# Patient Record
Sex: MALE | Race: WHITE | Employment: FULL TIME
[De-identification: names, ages, dates, MRNs, and addresses within clinical notes are randomized per-mention and may not be internally consistent; named-entity substitution may affect disease eponyms.]

---
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
## 2020-10-13 ENCOUNTER — OFFICE VISIT (OUTPATIENT)
Dept: PODIATRY | Age: 20
End: 2020-10-13
Payer: COMMERCIAL

## 2020-10-13 VITALS — HEIGHT: 75 IN | WEIGHT: 145 LBS | TEMPERATURE: 97.9 F | BODY MASS INDEX: 18.03 KG/M2

## 2020-10-13 PROCEDURE — 11750 EXCISION NAIL&NAIL MATRIX: CPT | Performed by: PODIATRIST

## 2020-10-13 PROCEDURE — 99203 OFFICE O/P NEW LOW 30 MIN: CPT | Performed by: PODIATRIST

## 2020-10-13 NOTE — PROGRESS NOTES
[address]  [facility] [address]  Dept: [phone]    NEW PATIENT INGROWN TOENAIL PROGRESS NOTE  Date of patient's visit: 10/13/2020  Patient's Name:  Tamika Ayala YOB: 2000              Chief Complaint   Patient presents with    New Patient    Ingrown Toenail     bl great toes        Tamika Ayala 21 y.o. male that presents complaining of a painful infected ingrown toenail on the 1st Left toes. Conservative therapy has failed. Symptoms began 1 month(s) ago. Patient relates pain is Present. Pain is rated 2 out of 10 and is described as intermittent. Treatments prior to today's visit include: none. Currently denies F/C/N/V. No Known Allergies    Past Medical History:   Diagnosis Date    Depression 6/7/2018    Headache        Prior to Admission medications    Medication Sig Start Date End Date Taking? Authorizing Provider   fluticasone (FLONASE) 50 MCG/ACT nasal spray instill 2 sprays into each nostril once daily 4/19/18  Yes Historical Provider, MD       History reviewed. No pertinent surgical history. History reviewed. No pertinent family history. Social History     Tobacco Use    Smoking status: Never Smoker    Smokeless tobacco: Never Used    Tobacco comment: pt exposed to second hand smoking   Substance Use Topics    Alcohol use: No       Lower Extremity Physical Examination:     Vitals:   Vitals:    10/13/20 1310   Temp: 97.9 °F (36.6 °C)     General: AAO x 3 in NAD. Review of Systems:  History obtained from chart review and the patient  General ROS: negative for - chills, fatigue, fever, night sweats or weight gain  Constitutional: Negative for chills, diaphoresis, fatigue, fever and unexpected weight change. Musculoskeletal: Positive for arthralgias, gait problem and joint swelling. Neurological ROS: negative for - behavioral changes, confusion, headaches or seizures.  Negative for weakness and numbness. Dermatological ROS: negative for - mole changes, rash  Cardiovascular: Negative for leg swelling. Gastrointestinal: Negative for constipation, diarrhea, nausea and vomiting. Integument: Incurvated toenail with localized swelling, pain, erythema, and purulence 1st Left, medial border    Musculoskeletal:     1st MPJ ROM decreased, Bilateral.  Muscle strength 5/5, Bilateral.  Pain present upon palpation of left hallux. Medial longitudinal arch, Bilateral WNL. Ankle ROM WNL,Bilateral.    Dorsally contracted digits absent digits 1-5 Bilateral.     Vascular: DP and PT pulses palpable 2/4, Bilateral.  CFT <3 seconds, Bilateral.  Hair growth present to the level of the digits, Bilateral.  Edema absent, Bilateral.  Varicosities absent, Bilateral. Erythema absent, Bilateral    Neurological: Sensation intact to light touch to level of digits, Bilateral.  Protective sensation intact 10/10 sites via 5.07/10g [place]-Ladi Monofilament, Bilateral.  negative Tinel's, Bilateral.  negative Valleix sign, Bilateral.      Integument: Warm, dry, supple, Bilateral.  Open lesion absent, Bilateral.  Interdigital maceration absent to web spaces 1-4, Bilateral.  Fissures absent, Bilateral.     Asessment: Patient is a 21 y.o. male with:    Diagnosis Orders   1. Paronychia of great toe, left  FL REMOVAL OF NAIL BED       Plan: Patient examined and evaluated. Current condition and treatment options discussed in detail. Verbal consent obtained for chemical matrixectomy after all questions answered. Local anesthesia obtained via Hallux block to the Left hallux utilizing 5 cc of 2% Lidocaine plain and 0.5% Marcaine plain. Next the Left hallux was prepped with Betadine. The medial nail border was removed completely to the level of the matrix and applied phenol x 3. The tourniquet was released and a sterile dressing was applied with antibiotic oinment/amerigel.  The patient tolerated the procedure well without apparent complications. Oral and written instructions were dispensed. The patient was instructed to leave this dressing clean/dry/intact until the following am at which point they will begin application of antibiotic ointment/Amerigel and Band-Aid QD. Patient instructed to take Tylenol or Ibuprofen PRN pain. Contact office with any questions/problems/concerns. No orders of the defined types were placed in this encounter. RTC in 1week(s).     10/13/2020    Electronically signed by Geraldo Benites DPM on 10/13/2020 at 1:56 PM  10/13/2020

## 2020-10-13 NOTE — LETTER
[facility] Podiatry  [address]  Phone: [phone]    Honorio Martinez        October 13, 2020     Patient: Kvng Paulino   YOB: 2000   Date of Visit: 10/13/2020       To Whom it May Concern:    Sundar Gongora was seen in my clinic on 10/13/2020. He may return to work on Thursday October 15 2020. If you have any questions or concerns, please don't hesitate to call.     Sincerely,         Olamide Sin DPM

## 2020-10-20 ENCOUNTER — OFFICE VISIT (OUTPATIENT)
Dept: PODIATRY | Age: 20
End: 2020-10-20

## 2020-10-20 VITALS — HEIGHT: 75 IN | BODY MASS INDEX: 18.03 KG/M2 | WEIGHT: 145 LBS | TEMPERATURE: 97 F

## 2020-10-20 PROCEDURE — 99024 POSTOP FOLLOW-UP VISIT: CPT | Performed by: PODIATRIST

## 2020-10-26 NOTE — PROGRESS NOTES
[address] [facility] [address] [id number] [facility] [address]  Dept: [phone]    POST-OP PROGRESS NOTE  Date of patient's visit: 10/26/2020  Patient's Name:  Jesus Alba YOB: 2000            No care team member to display        Chief Complaint   Patient presents with    Post-Op Check       Pt's primary care physician is No primary care provider on file. Subjective: Jesus Alba is a 21 y.o. male who presents to the office today 1week(s)  S/P left P&A for correction of ingrown nail  Problem List Items Addressed This Visit     None      Visit Diagnoses     Post-operative state    -  Primary      . Patient relates pain is Absent  and improved. Pain is rated 0 out of 10 and is described as none. Currently denies F/C/N/V. Physical Examination:  Minimal bleeding post operatively. Edema present. No erythema. No Pus. Operative correction is satisfactory. Assessment: Jesus Alba is status post as above  Normal post operative course. Doing well          ICD-10-CM    1. Post-operative state  Z98.890          Plan:  Patient examined and evaluated. Current condition and treatment options discussed in detail. Advised pt to soak for 1more week. Monitor for increased SOI. Verbal and written instructions given to patient. Orders: No orders of the defined types were placed in this encounter. Contact office with any questions/problems/concerns. RTC in 4week(s).      Electronically signed by Darrel Sidhu DPM on 10/26/2020 at 8:32 AM  10/20/2020

## 2021-03-23 ENCOUNTER — OFFICE VISIT (OUTPATIENT)
Dept: PODIATRY | Age: 21
End: 2021-03-23
Payer: COMMERCIAL

## 2021-03-23 VITALS — TEMPERATURE: 98.1 F | BODY MASS INDEX: 18.27 KG/M2 | WEIGHT: 150 LBS | HEIGHT: 76 IN

## 2021-03-23 DIAGNOSIS — Z98.890 POST-OPERATIVE STATE: Primary | ICD-10-CM

## 2021-03-23 DIAGNOSIS — M79.604 PAIN OF RIGHT LOWER EXTREMITY: ICD-10-CM

## 2021-03-23 PROCEDURE — 99213 OFFICE O/P EST LOW 20 MIN: CPT | Performed by: PODIATRIST

## 2021-03-23 NOTE — PROGRESS NOTES
[address] [facility] [address] [address] [address]  Dept: [phone]    RETURN PATIENT PROGRESS NOTE  Date of patient's visit: 3/23/2021  Patient's Name:  Doreen Miller YOB: 2000            No care team member to display       Doreen Miller 21 y.o. male that presents for follow-up of   Chief Complaint   Patient presents with    Nail Problem     right great toenail       Symptoms began several month(s) ago and are unchanged . Patient relates pain is Present. Pain is rated 3 out of 10 and is described as constant, mild, moderate. Treatments prior to today's visit include: previous podiatry treatment with P&A left great toe October 2020. Currently denies F/C/N/V. No Known Allergies    Past Medical History:   Diagnosis Date    Depression 6/7/2018    Headache        Prior to Admission medications    Not on File       Review of Systems    Review of Systems:  History obtained from chart review and the patient  General ROS: negative for - chills, fatigue, fever, night sweats or weight gain  Constitutional: Negative for chills, diaphoresis, fatigue, fever and unexpected weight change. Musculoskeletal: Positive for arthralgias, gait problem and joint swelling. Neurological ROS: negative for - behavioral changes, confusion, headaches or seizures. Negative for weakness and numbness. Dermatological ROS: negative for - mole changes, rash  Cardiovascular: Negative for leg swelling. Gastrointestinal: Negative for constipation, diarrhea, nausea and vomiting. Lower Extremity Physical Examination:     Vitals:   Vitals:    03/23/21 1449   Temp: 98.1 °F (36.7 °C)     General: AAO x 3 in NAD. Dermatologic Exam:  Skin lesion/ulceration Absent . Skin No rashes or nodules noted. .       Musculoskeletal:     1st MPJ ROM decreased, Bilateral.  Muscle strength 5/5, Bilateral.  Pain present upon palpation of right great toe medial border.   Medial longitudinal arch, Bilateral WNL. Ankle ROM WNL,Bilateral.    Dorsally contracted digits absent digits 1-5 Bilateral.     Vascular: DP and PT pulses palpable 2/4, Bilateral.  CFT <3 seconds, Bilateral.  Hair growth present to the level of the digits, Bilateral.  Edema absent, Bilateral.  Varicosities absent, Bilateral. Erythema absent, Bilateral    Neurological: Sensation intact to light touch to level of digits, Bilateral.  Protective sensation intact 10/10 sites via 5.07/10g [place]-Ladi Monofilament, Bilateral.  negative Tinel's, Bilateral.  negative Valleix sign, Bilateral.      Integument: Warm, dry, supple, Bilateral.  Open lesion absent, Bilateral.  Interdigital maceration absent to web spaces 1-4, Bilateral.  Nails are normal in length, thickness and color 1-5 bilateral.  Fissures absent, Bilateral.       Asessment: Patient is a 21 y.o. male with:    Diagnosis Orders   1. Post-operative state     2. Pain of right lower extremity           Plan: Patient examined and evaluated. Current condition and treatment options discussed in detail. Advised pt to closely monitor for increased edema or erythema. At this time as there is no evidence of infection , we do not suggest nail procedure. All labs were reviewed and all imagining including the above findings were reviewed prior to the patients arrival and with the patient today. Previous patient encounter was reviewed. Encounters from the patients other medical providers were reviewed and noted. Time was spent educating the patient and their families/caregivers on proper care of the feet and ankles. All the above diagnosis were addressed at todays visit and all questions were answered. A total of 25 minutes was spent with this patients encounter    Verbal and written instructions given to patient. Contact office with any questions/problems/concerns. No orders of the defined types were placed in this encounter.     No orders of the defined types were placed in this encounter.        RTC in 3month(s).    3/23/2021      Electronically signed by Canelo Daniel DPM on 3/23/2021 at 2:57 PM  3/23/2021

## 2021-04-22 ENCOUNTER — CLINICAL DOCUMENTATION (OUTPATIENT)
Dept: PSYCHOLOGY | Age: 21
End: 2021-04-22

## 2021-04-22 ENCOUNTER — TELEPHONE (OUTPATIENT)
Dept: PSYCHOLOGY | Age: 21
End: 2021-04-22

## 2021-05-25 ENCOUNTER — OFFICE VISIT (OUTPATIENT)
Dept: FAMILY MEDICINE CLINIC | Age: 21
End: 2021-05-25
Payer: COMMERCIAL

## 2021-05-25 ENCOUNTER — NURSE TRIAGE (OUTPATIENT)
Dept: OTHER | Facility: CLINIC | Age: 21
End: 2021-05-25

## 2021-05-25 VITALS
WEIGHT: 145 LBS | DIASTOLIC BLOOD PRESSURE: 82 MMHG | OXYGEN SATURATION: 99 % | SYSTOLIC BLOOD PRESSURE: 128 MMHG | HEIGHT: 75 IN | TEMPERATURE: 97.4 F | BODY MASS INDEX: 18.03 KG/M2 | HEART RATE: 69 BPM

## 2021-05-25 DIAGNOSIS — M25.561 RECURRENT PAIN OF RIGHT KNEE: ICD-10-CM

## 2021-05-25 DIAGNOSIS — M25.561 ACUTE PAIN OF RIGHT KNEE: Primary | ICD-10-CM

## 2021-05-25 PROCEDURE — 99213 OFFICE O/P EST LOW 20 MIN: CPT | Performed by: NURSE PRACTITIONER

## 2021-05-25 RX ORDER — PREDNISONE 20 MG/1
40 TABLET ORAL DAILY
Qty: 10 TABLET | Refills: 0 | Status: SHIPPED | OUTPATIENT
Start: 2021-05-25 | End: 2021-05-30

## 2021-05-25 ASSESSMENT — ENCOUNTER SYMPTOMS
CHEST TIGHTNESS: 0
SORE THROAT: 0
EYES NEGATIVE: 1
SHORTNESS OF BREATH: 0
EYE DISCHARGE: 0
EYE ITCHING: 0
NAUSEA: 0
ABDOMINAL PAIN: 0
DIARRHEA: 0
VOMITING: 0
ALLERGIC/IMMUNOLOGIC NEGATIVE: 1
COUGH: 0

## 2021-05-25 ASSESSMENT — PATIENT HEALTH QUESTIONNAIRE - PHQ9
SUM OF ALL RESPONSES TO PHQ QUESTIONS 1-9: 0
SUM OF ALL RESPONSES TO PHQ9 QUESTIONS 1 & 2: 0
2. FEELING DOWN, DEPRESSED OR HOPELESS: 0
SUM OF ALL RESPONSES TO PHQ QUESTIONS 1-9: 0

## 2021-05-25 NOTE — PATIENT INSTRUCTIONS

## 2021-05-25 NOTE — PROGRESS NOTES
[facility]   [facility] [place] Pope  Dept: [phone]  Dept Fax: [phone]    Kj Valiente is a 24 y.o. male who presents today forhis medical conditions/complaints as noted below. Kj Valiente is c/o of   Chief Complaint   Patient presents with    Knee Pain     right knee pain, sore when walking or put pressure on it, no injury     HPI:     Knee Pain   The incident occurred more than 1 week ago. The incident occurred at home. The injury mechanism is unknown. The pain is present in the right knee. The quality of the pain is described as aching. Pertinent negatives include no inability to bear weight, loss of motion, loss of sensation, muscle weakness, numbness or tingling. He reports no foreign bodies present. The symptoms are aggravated by movement, palpation and weight bearing. He has tried nothing for the symptoms. Works construction     Past Medical History:   Diagnosis Date    Depression 6/7/2018    Headache       History reviewed. No pertinent surgical history. History reviewed. No pertinent family history. Social History     Tobacco Use    Smoking status: Light Tobacco Smoker     Types: Cigarettes    Smokeless tobacco: Never Used    Tobacco comment: uses tobacco papers   Substance Use Topics    Alcohol use: Yes     Comment: rare      Current Outpatient Medications   Medication Sig Dispense Refill    predniSONE (DELTASONE) 20 MG tablet Take 2 tablets by mouth daily for 5 days 10 tablet 0     No current facility-administered medications for this visit. No Known Allergies    Subjective:      Review of Systems   Constitutional: Negative for appetite change, chills, fatigue and fever. HENT: Negative for congestion, postnasal drip and sore throat. Eyes: Negative. Negative for discharge and itching. Respiratory: Negative for cough, chest tightness and shortness of breath.     Cardiovascular: Negative for chest pain, palpitations and leg swelling. Gastrointestinal: Negative for abdominal pain, diarrhea, nausea and vomiting. Endocrine: Negative. Genitourinary: Negative for dysuria, frequency and urgency. Musculoskeletal: Positive for arthralgias. Negative for neck pain and neck stiffness. R knee pain and swelling    Skin: Negative for rash. Allergic/Immunologic: Negative. Neurological: Negative for dizziness, tingling, weakness, light-headedness, numbness and headaches. Hematological: Negative for adenopathy. Psychiatric/Behavioral: Negative for confusion. Objective:      Physical Exam  Vitals reviewed. Constitutional:       Appearance: He is well-developed. HENT:      Head: Normocephalic. Right Ear: External ear normal.      Left Ear: External ear normal.      Nose: Nose normal.   Eyes:      Conjunctiva/sclera: Conjunctivae normal.      Pupils: Pupils are equal, round, and reactive to light. Cardiovascular:      Rate and Rhythm: Normal rate and regular rhythm. Heart sounds: Normal heart sounds. No murmur heard. No friction rub. No gallop. Pulmonary:      Effort: Pulmonary effort is normal. No respiratory distress. Breath sounds: Normal breath sounds. Abdominal:      General: Bowel sounds are normal.      Palpations: Abdomen is soft. Musculoskeletal:         General: Normal range of motion. Cervical back: Normal range of motion and neck supple. Right knee: Swelling and ecchymosis present. Tenderness present over the medial joint line. Comments: Tenderness with palpation to medial aspect of L knee. Swelling palpated and visualized radiating over superior aspect of knee. Old ecchymosis noted on patella- yellow in color. Site not warm to touch    Lymphadenopathy:      Cervical: No cervical adenopathy. Skin:     General: Skin is warm and dry. Findings: No rash.    Neurological:      Mental Status: He is alert and oriented to person, place, and time. Cranial Nerves: No cranial nerve deficit. Coordination: Coordination normal.      Deep Tendon Reflexes: Reflexes are normal and symmetric. Psychiatric:         Thought Content: Thought content normal.       /82   Pulse 69   Temp 97.4 °F (36.3 °C) (Oral)   Ht 6' 3\" (1.905 m)   Wt 145 lb (65.8 kg)   SpO2 99%   BMI 18.12 kg/m²     Assessment:       Diagnosis Orders   1. Acute pain of right knee  predniSONE (DELTASONE) 20 MG tablet   2. Recurrent pain of right knee             Plan:     1.) Start Motrin PRN pain and swelling   2.) Compression encouraged- ace wrap provided   3.) Start Prednisone today   4.) Will order imaging if symptoms persist or worsen   5.) RTO PRN   6.) Follow-up with PCP   7.) Apply a compressive ACE bandage. Rest and elevate the affected painful area. Apply cold compresses intermittently as needed. As pain recedes, begin normal activities slowly as tolerated. Call if symptoms persist.    Problem List     None           Patient given educationalmaterials - see patient instructions. Discussed use, benefit, and side effectsof prescribed medications. All patient questions answered. Pt verbalized understanding. Instructed to continue current medications, diet and exercise. Patient agreedwith treatment plan. Follow up as directed.      Electronically signed by JEWELS Ring CNP on 5/25/2021 at 6:45 PM

## 2021-05-25 NOTE — TELEPHONE ENCOUNTER
Received call from Tamiko Cummins at pre-service center [facility]) [place] with [employer]. Brief description of triage: see below    Triage indicates for patient to see PCP within 24 hours. Pt agreeable to POC. Care advice provided, patient verbalizes understanding; denies any other questions or concerns; instructed to call back for any new or worsening symptoms. Writer provided warm transfer to Pala at [facility] for appointment scheduling. Attention Provider: Thank you for allowing me to participate in the care of your patient. The patient was connected to triage in response to information provided to the [facility]. Please do not respond through this encounter as the response is not directed to a shared pool. Reason for Disposition   [1] Redness of the skin AND [2] no fever    Answer Assessment - Initial Assessment Questions  1. LOCATION: \"Where is the swelling located? \"  (e.g., left, right, both knees)      Pt states right kneecap    2. SIZE and DESCRIPTION: \"What does the swelling look like? \"  (e.g., entire knee, localized)      Pt states grape-sized area; states it's a couple cm raised up    3. ONSET: \"When did the swelling start? \" \"Does it come and go, or is it there all the time? \"      This morning noticed it was sore    4. PAIN: \"Is there any pain? \" If so, ask: \"How bad is it? \" (Scale 1-10; or mild, moderate, severe)      Pt states minor; stated it's not even painful, just sore    5. SETTING: \"Has there been any recent work, exercise or other activity that involved that part of the body? \"       Pt works in construction    6. AGGRAVATING FACTORS: \"What makes the knee swelling worse? \" (e.g., walking, climbing stairs, running)      Pt states kneeling on it    7. ASSOCIATED SYMPTOMS: \"Is there any pain or redness? \"      Pt states yellowish bruising and warmth to the area    8. OTHER SYMPTOMS: \"Do you have any other symptoms? \" (e.g., chest pain, difficulty breathing, fever, calf pain)      Pt denies    9. PREGNANCY: \"Is there any chance you are pregnant? \" \"When was your last menstrual period? \"      N/A    Protocols used: KNEE SWELLING-ADULT-AH

## 2021-06-23 ENCOUNTER — NURSE TRIAGE (OUTPATIENT)
Dept: OTHER | Facility: CLINIC | Age: 21
End: 2021-06-23

## 2021-06-23 NOTE — TELEPHONE ENCOUNTER
Received call from Damien Quispe  at [facility]  with [employer]. Brief description of triage: seen 2 Weeks ago at the walk in for right knee pain and swelling. Walk in suggested patient wrap the right knee and then to call back if the swelling continues- no worse pain no worse swelling since he was seen at the walk in. Triage indicates for patient to be seen in the next 5 days. Care advice provided, patient verbalizes understanding; denies any other questions or concerns; instructed to call back for any new or worsening symptoms. Writer provided warm transfer to Jacinta Larkin at [facility]  for appointment scheduling. Attention Provider: Thank you for allowing me to participate in the care of your patient. The patient was connected to triage in response to information provided to the [facility]. Please do not respond through this encounter as the response is not directed to a shared pool. Reason for Disposition  [facility] Caller has already spoken with another triager or PCP (or office), and has further questions and triager able to answer questions.     Protocols used: NO CONTACT OR DUPLICATE CONTACT CALL-ADULT-[place]

## 2021-11-04 ENCOUNTER — HOSPITAL ENCOUNTER (OUTPATIENT)
Age: 21
Discharge: HOME | End: 2021-11-06
Payer: COMMERCIAL

## 2021-11-04 ENCOUNTER — HOSPITAL ENCOUNTER (OUTPATIENT)
Dept: GENERAL RADIOLOGY | Age: 21
Discharge: HOME | End: 2021-11-06
Payer: COMMERCIAL

## 2021-11-04 DIAGNOSIS — M22.91: ICD-10-CM

## 2021-11-04 PROCEDURE — 73562 X-RAY EXAM OF KNEE 3: CPT

## 2021-11-09 ENCOUNTER — OFFICE VISIT (OUTPATIENT)
Dept: ORTHOPEDIC SURGERY | Age: 21
End: 2021-11-09
Payer: COMMERCIAL

## 2021-11-09 VITALS — HEART RATE: 73 BPM | DIASTOLIC BLOOD PRESSURE: 86 MMHG | SYSTOLIC BLOOD PRESSURE: 129 MMHG

## 2021-11-09 DIAGNOSIS — M70.41 PREPATELLAR BURSITIS OF RIGHT KNEE: Primary | ICD-10-CM

## 2021-11-09 PROCEDURE — 99203 OFFICE O/P NEW LOW 30 MIN: CPT | Performed by: FAMILY MEDICINE

## 2021-11-09 NOTE — PROGRESS NOTES
Sports Medicine Consultation     CHIEF COMPLAINT:  Knee Pain (Rt knee. 4-5m. no injury. interminttent swelling)      HPI:  Adela Hampton is a 24y.o. year old male who is a new patient being seen for regarding new problem right knee pain. The pain has been present for 4-5 month(s). The patient recalls a pain and swelling without injury. The patient has tried nsaids and compression some improvement. The pain is described as more swelling than pain soreness after working. There is not pain on weightbearing. The knee does swell. There is is not painful popping and clicking. The knee does not catch or lock. It has not given out. It is  stiff upon arising from sitting. It is not painful to go up and down stairs and sit for a prolonged period of time. he has a past medical history of Depression and Headache.    he has no past surgical history on file. family history is not on file. Social History     Socioeconomic History    Marital status: Single     Spouse name: Not on file    Number of children: Not on file    Years of education: Not on file    Highest education level: Not on file   Occupational History    Not on file   Tobacco Use    Smoking status: Light Tobacco Smoker     Types: Cigarettes    Smokeless tobacco: Never Used    Tobacco comment: uses tobacco papers   Vaping Use    Vaping Use: Some days    Substances: Nicotine, THC, CBD, Flavoring, occ.     Substance and Sexual Activity    Alcohol use: Yes     Comment: rare    Drug use: Yes     Types: Marijuana [place])     Comment: daily    Sexual activity: Not on file   Other Topics Concern    Not on file   Social History Narrative    Not on file     Social Determinants of Health     Financial Resource Strain: Low Risk     Difficulty of Paying Living Expenses: Not hard at all   Food Insecurity: No Food Insecurity    Worried About [address] in the Last Year: Never true    [facility] in the Last Year: Never true   Transportation Needs:     Lack of Transportation (Medical): Not on file    Lack of Transportation (Non-Medical): Not on file   Physical Activity:     Days of Exercise per Week: Not on file    Minutes of Exercise per Session: Not on file   Stress:     Feeling of Stress : Not on file   Social Connections:     Frequency of Communication with Friends and Family: Not on file    Frequency of Social Gatherings with Friends and Family: Not on file    Attends [employer] Services: Not on file    Active Member of [address] [place] [address] [employer] or Organizations: Not on file    Attends Club or Organization Meetings: Not on file    Marital Status: Not on file   Intimate Partner Violence:     Fear of Current or Ex-Partner: Not on file    Emotionally Abused: Not on file    Physically Abused: Not on file    Sexually Abused: Not on file   Housing Stability:     Unable to Pay for Housing in the Last Year: Not on file    Number of [place] in the Last Year: Not on file    Unstable Housing in the Last Year: Not on file       Current Outpatient Medications   Medication Sig Dispense Refill    predniSONE (DELTASONE) 20 MG tablet I po bid x 4 days then I po daily x 3 days (Patient not taking: Reported on 10/22/2021) 11 tablet 0     No current facility-administered medications for this visit. Allergies:  hehas No Known Allergies. ROS:  CV:  Denies chest pain; palpitations; shortness of breath; swelling of feet, ankles; and loss of consciousness. CON: Denies fever and dizziness. ENT:  Denies hearing loss / ringing, ear infections hoarseness, and swallowing problems. RESP:  Denies chronic cough, spitting up blood, and asthma/wheezing. GI: Denies abdominal pain, change in bowel habits, nausea or vomiting, and blood in stools. :  Denies frequent urination, burning or painful urination, blood in the urine, and bladder incontinence.   NEURO:  Denies headache, memory loss, sleep disturbance, and tremor or movement disorder. PHYSICAL EXAM:   /86   Pulse 73   GENERAL: Shweta  is a 24 y.o. male who is alert and oriented and sitting comfortably in our office. SKIN:  Intact without rashes, lesions or ulcerations. NEURO: Sensation to the extremity is intact. VASC:  Capillary refill is less than 3 seconds. Distal pulses are palpable. There is no lymphadenopathy. Knee Exam  Musculoskeletal/Neurologic:  Inspection-Swelling: small bursa prepatellar , Ecchymosis: no  Palpation-Tenderness:no sig ttp  Pain with patellar grind: no  ROM- 0-135  Strength- WNL  Sensation-normal to light touch    Special Tests-  Varus Laxity: negative   Valgus Laxity:  negative   Anterior Drawer: negative   Posterior Drawer: negative  Lachman's: negative  Jessa's:negative  Thessaly: negative    Single Leg Squat: Negative  Deep Squat: Negative  Gait: normal    PSYCH:  Good fund of knowledge and displays understanding of exam.    RADIOLOGY: XR KNEE RIGHT (3 VIEWS)    Result Date: 11/4/2021  Normal exam     IMPRESSION:     1. Prepatellar bursitis of right knee          PLAN:   We discussed some of the etiologies and natural histories of     ICD-10-CM    1. Prepatellar bursitis of right knee  M70.41    . We discussed the various treatment alternatives including anti-inflammatory medications, physical therapy, injections, further imaging studies and as a last resort surgery. At this point patient has a small piece of prepatellar bursitis in that right knee he can be can treated conservatively with activity avoidance and protection we will give him kneepads for work and follow-up otherwise as needed. He voiced understanding agreement this plan. Return to clinic in No follow-ups on file. Malik Askew     Please be aware portions of this note were completed using voice recognition software and unforeseen errors may have occurred    Electronically signed by Carlito Lyon DO, FAOASM  on 11/9/21 at 10:58 AM EST        No orders of the defined types were placed in this encounter.